# Patient Record
Sex: MALE | Race: OTHER | NOT HISPANIC OR LATINO | Employment: STUDENT | URBAN - METROPOLITAN AREA
[De-identification: names, ages, dates, MRNs, and addresses within clinical notes are randomized per-mention and may not be internally consistent; named-entity substitution may affect disease eponyms.]

---

## 2023-10-12 ENCOUNTER — OFFICE VISIT (OUTPATIENT)
Dept: PRIMARY CARE | Facility: HOSPITAL | Age: 38
End: 2023-10-12
Payer: COMMERCIAL

## 2023-10-12 VITALS
HEIGHT: 70 IN | DIASTOLIC BLOOD PRESSURE: 69 MMHG | WEIGHT: 172 LBS | BODY MASS INDEX: 24.62 KG/M2 | SYSTOLIC BLOOD PRESSURE: 111 MMHG | HEART RATE: 69 BPM | OXYGEN SATURATION: 98 % | TEMPERATURE: 98.9 F

## 2023-10-12 DIAGNOSIS — E03.9 HYPOTHYROIDISM, UNSPECIFIED TYPE: Primary | ICD-10-CM

## 2023-10-12 LAB
ALBUMIN SERPL BCP-MCNC: 4.5 G/DL (ref 3.4–5)
ALP SERPL-CCNC: 62 U/L (ref 33–120)
ALT SERPL W P-5'-P-CCNC: 20 U/L (ref 10–52)
ANION GAP SERPL CALC-SCNC: 15 MMOL/L (ref 10–20)
AST SERPL W P-5'-P-CCNC: 17 U/L (ref 9–39)
BILIRUB SERPL-MCNC: 0.4 MG/DL (ref 0–1.2)
BUN SERPL-MCNC: 20 MG/DL (ref 6–23)
CALCIUM SERPL-MCNC: 9.4 MG/DL (ref 8.6–10.6)
CHLORIDE SERPL-SCNC: 102 MMOL/L (ref 98–107)
CO2 SERPL-SCNC: 27 MMOL/L (ref 21–32)
CREAT SERPL-MCNC: 1.23 MG/DL (ref 0.5–1.3)
GFR SERPL CREATININE-BSD FRML MDRD: 77 ML/MIN/1.73M*2
GLUCOSE SERPL-MCNC: 102 MG/DL (ref 74–99)
POTASSIUM SERPL-SCNC: 4.5 MMOL/L (ref 3.5–5.3)
PROT SERPL-MCNC: 7.7 G/DL (ref 6.4–8.2)
SODIUM SERPL-SCNC: 139 MMOL/L (ref 136–145)
TSH SERPL-ACNC: 5.28 MIU/L (ref 0.44–3.98)

## 2023-10-12 PROCEDURE — 84443 ASSAY THYROID STIM HORMONE: CPT | Performed by: STUDENT IN AN ORGANIZED HEALTH CARE EDUCATION/TRAINING PROGRAM

## 2023-10-12 PROCEDURE — 99214 OFFICE O/P EST MOD 30 MIN: CPT | Mod: GC | Performed by: STUDENT IN AN ORGANIZED HEALTH CARE EDUCATION/TRAINING PROGRAM

## 2023-10-12 PROCEDURE — 36415 COLL VENOUS BLD VENIPUNCTURE: CPT | Performed by: STUDENT IN AN ORGANIZED HEALTH CARE EDUCATION/TRAINING PROGRAM

## 2023-10-12 PROCEDURE — 80053 COMPREHEN METABOLIC PANEL: CPT | Performed by: STUDENT IN AN ORGANIZED HEALTH CARE EDUCATION/TRAINING PROGRAM

## 2023-10-12 PROCEDURE — 99214 OFFICE O/P EST MOD 30 MIN: CPT | Performed by: STUDENT IN AN ORGANIZED HEALTH CARE EDUCATION/TRAINING PROGRAM

## 2023-10-12 PROCEDURE — 1036F TOBACCO NON-USER: CPT | Performed by: STUDENT IN AN ORGANIZED HEALTH CARE EDUCATION/TRAINING PROGRAM

## 2023-10-12 RX ORDER — LEVOTHYROXINE SODIUM 50 UG/1
50 TABLET ORAL
Qty: 30 TABLET | Refills: 11 | Status: SHIPPED | OUTPATIENT
Start: 2023-10-12 | End: 2023-10-16 | Stop reason: SDUPTHER

## 2023-10-12 SDOH — ECONOMIC STABILITY: FOOD INSECURITY: WITHIN THE PAST 12 MONTHS, THE FOOD YOU BOUGHT JUST DIDN'T LAST AND YOU DIDN'T HAVE MONEY TO GET MORE.: NEVER TRUE

## 2023-10-12 SDOH — ECONOMIC STABILITY: FOOD INSECURITY: WITHIN THE PAST 12 MONTHS, YOU WORRIED THAT YOUR FOOD WOULD RUN OUT BEFORE YOU GOT MONEY TO BUY MORE.: NEVER TRUE

## 2023-10-12 ASSESSMENT — PAIN SCALES - GENERAL: PAINLEVEL: 0-NO PAIN

## 2023-10-12 ASSESSMENT — LIFESTYLE VARIABLES
HOW OFTEN DO YOU HAVE SIX OR MORE DRINKS ON ONE OCCASION: NEVER
SKIP TO QUESTIONS 9-10: 1
AUDIT-C TOTAL SCORE: 0
HOW MANY STANDARD DRINKS CONTAINING ALCOHOL DO YOU HAVE ON A TYPICAL DAY: PATIENT DOES NOT DRINK
HOW OFTEN DO YOU HAVE A DRINK CONTAINING ALCOHOL: NEVER

## 2023-10-12 ASSESSMENT — ENCOUNTER SYMPTOMS
DEPRESSION: 0
LOSS OF SENSATION IN FEET: 0
OCCASIONAL FEELINGS OF UNSTEADINESS: 0

## 2023-10-12 ASSESSMENT — PATIENT HEALTH QUESTIONNAIRE - PHQ9
2. FEELING DOWN, DEPRESSED OR HOPELESS: NOT AT ALL
1. LITTLE INTEREST OR PLEASURE IN DOING THINGS: NOT AT ALL
SUM OF ALL RESPONSES TO PHQ9 QUESTIONS 1 & 2: 0

## 2023-10-12 NOTE — PROGRESS NOTES
Reason for Visit: Establish Care; Script for Synthroid   HPI:    37 M with PMHx of Hypothyroidism (2016 - unclear etiology) who presents to establish care. Reports moved to [facility] (for [facility]/ graduate nursing school) from [place] in Dec and recently ran out of his synthroid medication. Diagnosed with hypothyroidism in 2016 after having symptoms of weakness and lethargy. ROS negative at this time. Not interested in the Flu shot today.     VSS  Meds:  Synthroid 50 MCG      Allergies: none  Past Medical History: as above  Past Surgical History: none  Social History: Single; graduate school student in nursing. Moved to [facility] from [place]. Does not smoke or drink.     ROS: Comprehensive ROS performed and negative except as per HPI      Constitutional: NAD  Eyes: EOMI, clear sclera  HENT: MMM  Head/neck: no appreciable JVD  Resp/thorax: CTAB, adequate air movement, no inc WOB  CV: +S1/S2, RRR, no m/r/g  GI: soft, NT,ND, +BS, no appreciable HSM  Ext: no edema/asymmetry  Neuro: AOx4, pleasant, conversational, following commands, no gross focal neuro deficits  Skin: warm and dry  Psych: mood/affect appropriate      Assessment/Plan:    37 M with PMHx of Hypothyroidism (2016 - unclear etiology) who presents to establish care. Overall well and without acute complaints. Only requires medication refill.     #Hypothyroidism   -Refill Synthroid 50 MCG  -Obtain TSH and CMP     #HM  - Colonoscopy: start at 44   Cardiovascular Screening   - ASCVD risk score: consider at next visit   - HIV: consider at next visit   - Syphilis consider at next visit   - Hepatitis C: consider at next visit   Vaccines - Discuss vaccination hx at next visit   - Influenza: Declined   - Tdap:   - Pneumonia:   - COVID:      RTC 1 year

## 2023-10-12 NOTE — PATIENT INSTRUCTIONS
Hello Bootan. It was nice to meet you. We sent your thyroid medication to the [facility] on [address]. Please come back to the clinic on next year.

## 2023-10-13 NOTE — PROGRESS NOTES
I saw and evaluated the patient. I personally obtained the key and critical portions of the history and physical exam or was physically present for key and critical portions performed by the resident/fellow. I reviewed the resident/fellow's documentation and discussed the patient with the resident/fellow. I agree with the resident/fellow's medical decision making as documented in the note.    Ofe Marroquin MD MPH

## 2023-10-16 DIAGNOSIS — E03.9 HYPOTHYROIDISM, UNSPECIFIED TYPE: Primary | ICD-10-CM

## 2023-10-16 RX ORDER — LEVOTHYROXINE SODIUM 50 UG/1
75 TABLET ORAL
Qty: 30 TABLET | Refills: 11 | Status: SHIPPED | OUTPATIENT
Start: 2023-10-16 | End: 2023-10-16 | Stop reason: ENTERED-IN-ERROR

## 2023-10-16 RX ORDER — LEVOTHYROXINE SODIUM 50 UG/1
50 TABLET ORAL
Qty: 30 TABLET | Refills: 11 | Status: SHIPPED | OUTPATIENT
Start: 2023-10-16 | End: 2024-10-15

## 2023-10-16 NOTE — PROGRESS NOTES
TSH drawn week prior (with reflex to T4) demonstrated hypothyroid state. Spoke to patient on the phone and he informed that he had run out and was not taking his synthroid medications for 3 weeks. In light of this, we agreed to continue on his prior dosing of 50 mcg/day and to recheck TSH with reflex in 4 weeks time.

## 2024-03-11 ENCOUNTER — APPOINTMENT (OUTPATIENT)
Dept: PRIMARY CARE | Facility: HOSPITAL | Age: 39
End: 2024-03-11
Payer: COMMERCIAL

## 2024-03-11 NOTE — PROGRESS NOTES
Reason for Visit: Establish Care; Script for Synthroid   HPI:    Lena Batista is a 36 y/o Male  with PMHx of Hypothyroidism (2016 - unclear etiology) who presents for follow up.     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 Diagnosed with hypothyroidism in 2016 after having symptoms of weakness and lethargy. Since then patient reported       Meds:  Synthroid 50 MCG      Allergies: none  Past Medical History: as above  Past Surgical History: none  Social History: Single; graduate school student in nursing. Moved to [facility] from [place]. Does not smoke or drink.     ROS: Comprehensive ROS performed and negative except as per HPI      Constitutional: NAD  Eyes: EOMI, clear sclera  HENT: MMM  Head/neck: no appreciable JVD  Resp/thorax: CTAB, adequate air movement, no inc WOB  CV: +S1/S2, RRR, no m/r/g  GI: soft, NT,ND, +BS, no appreciable HSM  Ext: no edema/asymmetry  Neuro: AOx4, pleasant, conversational, following commands, no gross focal neuro deficits  Skin: warm and dry  Psych: mood/affect appropriate      Assessment/Plan:    37 M with PMHx of Hypothyroidism (2016 - unclear etiology) who presents to clinic for follow up.     #Hypothyroidism   -Refill Synthroid 50 MCG  -Obtain TSH and CMP     #HM  - Colonoscopy: start at 44     Cardiovascular Screening   - ASCVD risk score: consider at next visit   - HIV: consider at next visit   - Syphilis consider at next visit   - Hepatitis C: consider at next visit     Vaccines - Discuss vaccination hx at next visit   - Influenza: Declined   - Tdap:   - Pneumonia:   - COVID:      RTC 1 year

## 2024-06-24 ENCOUNTER — APPOINTMENT (OUTPATIENT)
Dept: SURGERY | Facility: CLINIC | Age: 39
End: 2024-06-24
Payer: COMMERCIAL

## 2024-10-17 DIAGNOSIS — E03.9 HYPOTHYROIDISM, UNSPECIFIED TYPE: ICD-10-CM

## 2024-10-17 RX ORDER — LEVOTHYROXINE SODIUM 50 UG/1
50 TABLET ORAL
Qty: 30 TABLET | Refills: 11 | Status: SHIPPED | OUTPATIENT
Start: 2024-10-17 | End: 2024-10-17 | Stop reason: WASHOUT

## 2024-11-04 ENCOUNTER — OFFICE VISIT (OUTPATIENT)
Dept: PRIMARY CARE | Facility: HOSPITAL | Age: 39
End: 2024-11-04
Payer: COMMERCIAL

## 2024-11-04 ENCOUNTER — DOCUMENTATION (OUTPATIENT)
Dept: PRIMARY CARE | Facility: HOSPITAL | Age: 39
End: 2024-11-04

## 2024-11-04 VITALS
HEART RATE: 66 BPM | WEIGHT: 168 LBS | BODY MASS INDEX: 24.05 KG/M2 | DIASTOLIC BLOOD PRESSURE: 69 MMHG | OXYGEN SATURATION: 98 % | HEIGHT: 70 IN | TEMPERATURE: 97.4 F | SYSTOLIC BLOOD PRESSURE: 104 MMHG

## 2024-11-04 DIAGNOSIS — H00.012 HORDEOLUM EXTERNUM OF RIGHT LOWER EYELID: ICD-10-CM

## 2024-11-04 DIAGNOSIS — Z00.00 HEALTH CARE MAINTENANCE: ICD-10-CM

## 2024-11-04 DIAGNOSIS — E03.9 HYPOTHYROIDISM, UNSPECIFIED TYPE: Primary | ICD-10-CM

## 2024-11-04 PROCEDURE — 99213 OFFICE O/P EST LOW 20 MIN: CPT

## 2024-11-04 PROCEDURE — 99213 OFFICE O/P EST LOW 20 MIN: CPT | Mod: GC

## 2024-11-04 PROCEDURE — 1036F TOBACCO NON-USER: CPT

## 2024-11-04 PROCEDURE — 3008F BODY MASS INDEX DOCD: CPT

## 2024-11-04 RX ORDER — LEVOTHYROXINE SODIUM 50 UG/1
50 TABLET ORAL DAILY
Qty: 90 TABLET | Refills: 3 | Status: SHIPPED | OUTPATIENT
Start: 2024-11-04 | End: 2025-11-04

## 2024-11-04 ASSESSMENT — PATIENT HEALTH QUESTIONNAIRE - PHQ9
2. FEELING DOWN, DEPRESSED OR HOPELESS: NOT AT ALL
SUM OF ALL RESPONSES TO PHQ9 QUESTIONS 1 AND 2: 0
1. LITTLE INTEREST OR PLEASURE IN DOING THINGS: NOT AT ALL

## 2024-11-04 ASSESSMENT — PAIN SCALES - GENERAL: PAINLEVEL_OUTOF10: 0-NO PAIN

## 2024-11-04 ASSESSMENT — COLUMBIA-SUICIDE SEVERITY RATING SCALE - C-SSRS
2. HAVE YOU ACTUALLY HAD ANY THOUGHTS OF KILLING YOURSELF?: NO
1. IN THE PAST MONTH, HAVE YOU WISHED YOU WERE DEAD OR WISHED YOU COULD GO TO SLEEP AND NOT WAKE UP?: NO
6. HAVE YOU EVER DONE ANYTHING, STARTED TO DO ANYTHING, OR PREPARED TO DO ANYTHING TO END YOUR LIFE?: NO

## 2024-11-04 ASSESSMENT — ENCOUNTER SYMPTOMS
DEPRESSION: 0
OCCASIONAL FEELINGS OF UNSTEADINESS: 0
LOSS OF SENSATION IN FEET: 0

## 2024-11-04 NOTE — PATIENT INSTRUCTIONS
It was very nice to see you in the clinic today. These are the things we talked about today.  I sent the refill for your levothyroxine to your pharmacy.  I also ordered labs for you, please get these done in the lab at your earliest convenience. This includes a fasting lipid panel to check your cholesterol so please do not have anything to eat or drink except water on the morning you decide to get the labs done.  For your eye, this is a condition called a hordeolum (stye). We talked about warm compresses and gently cleansing the eye lid with tear free shampoo which should help. Please try not to pinch it as this can cause infection. If after a few weeks it still isn't getting better please let us know.  If you are ever interested in starting PrEP please let us know, we'd be happy to talk to you more about this if you're ever interested or considering becoming sexually active.  Please return to the clinic in 12 months for a follow up visit, or earlier if needed.  Please also get your flu shot, and remember to submit your vaccination records so we can have them on file.    If you have any questions, would like to be seen by a doctor, or need refills on your medications please call our office at [phone].

## 2024-11-04 NOTE — PROGRESS NOTES
Advanced care planning discussed at this visit.  Patient does not currently have a Healthcare Power of  or Living Will in place. He does express that his friend Dario Lyon has his permission to act as his surrogate decision maker in the event of an emergency. Patient advised to bring in POA, Living Will and Advanced Care Plan for his chart if he  obtains one.  Patient declined information and documentation on POA and Living Will at this time.

## 2024-11-04 NOTE — PROGRESS NOTES
[facility]    CC:  HPI:  Lena Batista is a 39 y.o. male with PMH of hypothyrodism who presented to [facility] for follow up visit.    Interval History: He was last seen at [facility] on 10/12/2023 to establish care. 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 He was given script with plan to follow up in 1 year.    Per the patient: Working on nursing PhD, intense program but going well. Has an upcoming exam that he's stressed about, studying social determinants of health. Has 2 years left in the program. States he mostly just needs a refill for his levothyroxine. Also has a bump on his lower right eye lid that has been there for a week, he states he squeezed it earlier today and expressed some material from it. It isn't painful and his vision isn't affected, but it is bothersome to him. He's gotten some artificial tears which he hopes will help, he states he's had a similar problem in the past in his other eye that he ended up needing surgery to fix.     Health maintenance:  Health Maintenance   Topic Date Due    Yearly Adult Physical  Never done    Lipid Panel  Never done    MMR Vaccines (1 of 1 - Standard series) Never done    Varicella Vaccines (1 of 2 - 13+ 2-dose series) Never done    Hepatitis C Screening  Never done    Hepatitis B Vaccines (1 of 3 - 19+ 3-dose series) Never done    DTaP/Tdap/Td Vaccines (1 - Tdap) Never done    Influenza Vaccine (1) Never done    COVID-19 Vaccine (3 - 2024-25 season) 09/01/2024    TSH Level  10/12/2024    Zoster Vaccines (1 of 2) 10/13/2034    HIV Screening  Completed    HIB Vaccines  Aged Out    IPV Vaccines  Aged Out    Hepatitis A Vaccines  Aged Out    Meningococcal Vaccine  Aged Out    Rotavirus Vaccines  Aged Out    HPV Vaccines  Aged Out    Pneumococcal Vaccine: Pediatrics (0 to 5 Years) and At-Risk Patients (6 to 64 Years)  Aged Out        Medications:    Current Outpatient Medications:     levothyroxine (Synthroid, Levoxyl) 50 mcg tablet, Take 1 tablet (50 mcg) by mouth early in the morning.. Take on an empty stomach at the same time each day, either 30 to 60 minutes prior to breakfast, Disp: 90 tablet, Rfl: 3    Require med refills? levothyroxine  Preferred pharmacy: [address]       Past medical history:  No past medical history on file.    Allergies:  No Known Allergies    Surgical history:  No past surgical history on file.    Family history:  No family history on file.    Social history:   reports that he has never smoked. He has never used smokeless tobacco. He reports that he does not drink alcohol and does not use drugs.    Social History     Social History Narrative    Not on file     Social history  Work: Nursing PhD student  Alcohol: Denies drinking   Tobacco: Never smoker  Other drugs: Denies other drugs  Sexually active?: States he is not currently sexually active but has considered PReP in the past. Not currently interested in STI testing.    Review of systems:  Constitutional: negative for fevers, chills, weight loss, weight gain, change in appetite, fatigue, weakness.  HEENT: negative for headache, changes in vision or hearing, congestion, sore throat.  Respiratory: negative for SOB, cough, hemoptysis, wheezing  Cardiovascular: negative for chest pain, palpitations, orthopnea, PND  GI: negative for dysphagia, abdominal pain, nausea, vomiting, diarrhea, constipation, melena, hematochezia, BRBPR  : negative for frequency, urgency, dysuria, hematuria, incontinence  MSK: negative for myalgia, arthralgia, decreased joint ROM, LE swelling  Skin: negative for rash, wounds  Heme/lymph: negative for easy bruising, bleeding, epistaxis  Neuro: negative for LOC, numbness, tingling, tremor, vertigo, dizziness    Vitals:  Vitals:    11/04/24 1505   BP: 104/69   Pulse: 66   Temp: 36.3 °C (97.4 °F)   SpO2: 98%       Physical  exam:  GENERAL.: Vitals noted, no distress.   HEENT: Normocephalic, atraumatic, MMM. No lymphadenopathy.  EYES: PERRLA, EOMI. Normal conjunctiva. No scleral icterus, swelling present over right lower eyelid, non tender, no purulence noted  NECK: Supple, FROM, no thyroid nodules noted  CV: Regular rate rhythm. No murmurs appreciated. Intact radial pulses.  LUNGS: Clear to auscultation bilaterally. Symmetric chest rise. No wheezes, rales, or rhonchi  ABDOMEN: Soft, nontender. No distention.  EXTREMITIES: No edema. FROM  SKIN: No rash noted on exposed skin  NEURO: No focal neurologic deficits. Alert and interactive, spontaneously moves all 4 extremities  PSYCH: Appropriate mood and affect    Assessment and Plan:  Lena Batista is a 39 y.o. male with PMH of hypothyroidism who presented to [facility] for follow up visit. Primary concerns include med refills and swelling on right lower eyelid c/w hordeolum.     #Hypothyroidism  -diagnosed 2016 per the patient, unclear etiology  -[facility] lab results reviewed in Care Everywhere, TSH was WNL as of 5/2022  -last TSH 5.28 (10/2023) in setting of running out of meds  -currently on levothyroxine 50 mcg daily    Plan:  -repeat TSH  -continue levothyroxine 50 mcg daily    #Hordeolum  -noted on exam, patient reports has been present about 1 week and is bothersome but not currently painful    Plan:  -recommended conservative management with warm compresses, gentle cleansing with tear free shampoo, and avoidance of manipulation/expressing material due to potential risk of infection  -if remains present for several weeks despite this management, would refer to ophthalmology for further management    #Interest in PReP  -patient states he is not currently sexually active, but has considered PReP in the past  -He generally tries to limit medications that he is taking, and is not interested in starting PReP now, but we discussed that our office would be happy to  discuss this with him in the future if things change.     #Health Maintenance  #Immunizations: Recommended flu shot, encouraged patient to bring records for review to next appointment    #Screening:  -Cardiovascular: lipid panel (12/2020) with cholesterol 122, HDL 50, LDL 63, trig 43  -Diabetes: no HBA1C on file  -Cancer: Colorectal (Due at 44), Lung (not indicated)  -Infectious Disease: HIV negative 5/2022, no HEPC on file  -Osteoporosis: not indicated  Behavioral Counseling: Tobacco/smoking (never smoker), alcohol (does not drink), safety (no current concerns), diet/exercise (states he exercises regularly)    Plan:  -lipid panel, HBA1C, HEPC ordered  -recommended flu shot  -encouraged patient to bring immunization records to next visit or submit via [employer]    RTC 12 months or earlier if needed    Pt staffed with attending Dr. Marin

## 2024-11-05 NOTE — PROGRESS NOTES
I saw and evaluated the patient. I personally obtained the key and critical portions of the history and physical exam or was physically present for key and critical portions performed by the resident/fellow. I reviewed the resident/fellow's documentation and discussed the patient with the resident/fellow. I agree with the resident/fellow's medical decision making as documented in the note.    Padmini Marin MD MPH

## 2025-02-07 ENCOUNTER — OFFICE VISIT (OUTPATIENT)
Dept: URGENT CARE | Age: 40
End: 2025-02-07
Payer: COMMERCIAL

## 2025-02-07 VITALS
SYSTOLIC BLOOD PRESSURE: 111 MMHG | BODY MASS INDEX: 24.11 KG/M2 | WEIGHT: 168 LBS | HEART RATE: 96 BPM | TEMPERATURE: 98.3 F | DIASTOLIC BLOOD PRESSURE: 75 MMHG | RESPIRATION RATE: 16 BRPM | OXYGEN SATURATION: 96 %

## 2025-02-07 DIAGNOSIS — J10.1 INFLUENZA B: ICD-10-CM

## 2025-02-07 DIAGNOSIS — R68.89 FLU-LIKE SYMPTOMS: Primary | ICD-10-CM

## 2025-02-07 LAB
POC RAPID INFLUENZA A: NEGATIVE
POC RAPID INFLUENZA B: POSITIVE
POC SARS-COV-2 AG BINAX: NORMAL

## 2025-02-07 RX ORDER — BENZONATATE 200 MG/1
200 CAPSULE ORAL 3 TIMES DAILY PRN
Qty: 30 CAPSULE | Refills: 0 | Status: SHIPPED | OUTPATIENT
Start: 2025-02-07 | End: 2025-02-14

## 2025-02-07 RX ORDER — OSELTAMIVIR PHOSPHATE 75 MG/1
75 CAPSULE ORAL EVERY 12 HOURS
Qty: 10 CAPSULE | Refills: 0 | Status: SHIPPED | OUTPATIENT
Start: 2025-02-07 | End: 2025-02-12

## 2025-02-07 ASSESSMENT — ENCOUNTER SYMPTOMS
COUGH: 1
LOSS OF CONSCIOUSNESS: 1
HEADACHES: 1

## 2025-02-07 NOTE — PROGRESS NOTES
Subjective   Patient ID: Lena Batista is a 39 y.o. male. They present today with a chief complaint of Cough, Generalized Body Aches, Headache (Onset sx Tuesday along with bloody sputum), Loss of Consciousness, and Poor Appetite.    History of Present Illness    Cough  Associated symptoms include headaches.   Headache  Associated symptoms: congestion and cough    Loss of Consciousness  Associated symptoms: congestion, cough, headaches and loss of consciousness        Past Medical History  Allergies as of 02/07/2025    (No Known Allergies)       (Not in a hospital admission)       History reviewed. No pertinent past medical history.    History reviewed. No pertinent surgical history.     reports that he has never smoked. He has never used smokeless tobacco. He reports that he does not drink alcohol and does not use drugs.    Review of Systems  Review of Systems   HENT:  Positive for congestion.    Respiratory:  Positive for cough.    Neurological:  Positive for loss of consciousness and headaches.   All other systems reviewed and are negative.                                 Objective    Vitals:    02/07/25 0951   BP: 111/75   Pulse: 96   Resp: 16   Temp: 36.8 °C (98.3 °F)   TempSrc: Oral   SpO2: 96%   Weight: 76.2 kg (168 lb)     No LMP for male patient.    Physical Exam  Vitals and nursing note reviewed.   Constitutional:       Appearance: Normal appearance. He is ill-appearing.   HENT:      Head: Normocephalic.      Nose: Congestion and rhinorrhea present.      Mouth/Throat:      Mouth: Mucous membranes are moist.      Pharynx: Oropharynx is clear.   Eyes:      Extraocular Movements: Extraocular movements intact.      Pupils: Pupils are equal, round, and reactive to light.   Cardiovascular:      Rate and Rhythm: Normal rate and regular rhythm.      Pulses: Normal pulses.      Heart sounds: Normal heart sounds.   Pulmonary:      Effort: Pulmonary effort is normal.      Breath sounds: Normal breath sounds.    Musculoskeletal:         General: Normal range of motion.      Cervical back: Normal range of motion and neck supple.   Skin:     General: Skin is warm and dry.   Neurological:      General: No focal deficit present.      Mental Status: He is alert and oriented to person, place, and time.   Psychiatric:         Mood and Affect: Mood normal.         Behavior: Behavior normal.         Procedures    Point of Care Test & Imaging Results from this visit  No results found for this visit on 02/07/25.   No results found.    Diagnostic study results (if any) were reviewed by [facility].    Assessment/Plan   Allergies, medications, history, and pertinent labs/EKGs/Imaging reviewed by Jane Otoloe, APRN-CNP.     Medical Decision Making  Rapid covid- NEG  Rapid flu B- POSITIVE  Pt stated he fainted getting up from bed two days ago, has not been drinking much  Education provided to pt multiple times, if pt lost consciousness he is required to go to ER for further evaluation and provided reasoning. Pt stated he did not black out, but faint sensation  Again, educated pt on need for further evaluation, but pt states he felt better past two days    Pt managing secretions, airway intact. There or no signs of PTA/TA, trismus, retropharyngeal abscess or epiglotitis. No signs of osteomyelitis, orbital cellulitis or other complications of sinusitis at this point in time. CXR deferred at this time as lungs are CTA and there is no signs of respiratory distress. SpO2 >94% on RA. There is no reported SOB, CP, NICE, pleuritic pain, fever. Clinical suspicions of pneumonia or other cardiorespiratory catastrophe at this time are low. Pt is ambulating without difficulty showing no signs of hypoxia. Given the pt underlying risk factors, and exam will err on the side of caution and prescribe tamiflu and tessalon perles. Pt well-hydrated, nontoxic and there no signs of clinical deterioration. Patient well hydrated,  neurovascularly intact. Advised normal saline mist spray TID, flonase daily, antihistamine daily, vit d3/c/zinc/elderberry, probiotics for gut health, and if s/s persist after 48 hours to f/u PCP  Stay home for 24-48 hours for rest and reduce contagion  If s/s worsen, go to ER    Follow up Care: Pt instructed to follow-up with PCP or other appropriate clinician within 24 to 48 hours. Report to ED if there is any development in worsening pain, difficulty swallowing, change in phonation, fever, chills, neck pain, photophobia, headache, neck stiffness, chest pain, abdominal pain, vomiting, syncope, hemoptysis, leg swelling SOB, fever, facial swelling, eye pain, periorbital swelling/erythema, or any new signs or sx.     The patient was educated regarding diagnosis, supportive care, OTC and Rx medications. The patient was given the opportunity to ask questions prior to discharge. They verbalized understanding of my discussion of the plans for treatment, expected course, indications to return to UC or seek further evaluation in ED, and the need for timely follow up as directed.     Can try Sambucol Black Elderberry Syrup and Extra Vitamin C and Zinc Lozenges (lozenges only if over 3 years of age)      1. **Stay Home**: Individuals who are sick should stay home for at least 24 hours after their fever has subsided without the use of fever-reducing medications.  2. **Avoid Close Contact**: Sick individuals should avoid close contact with others to prevent spreading the virus.  3. **Hygiene Practices**: Frequent handwashing, using hand sanitizers, and covering coughs and sneezes are encouraged to reduce transmission.  4. **Vaccination**: Annual flu vaccines are recommended for most individuals to help prevent illness      Orders and Diagnoses  Diagnoses and all orders for this visit:  Flu-like symptoms  -     POCT Influenza A/B manually resulted  -     POCT Covid-19 Rapid Antigen  Influenza B  -     oseltamivir (Tamiflu) 75  mg capsule; Take 1 capsule (75 mg) by mouth every 12 hours for 5 days.  -     benzonatate (Tessalon) 200 mg capsule; Take 1 capsule (200 mg) by mouth 3 times a day as needed for cough for up to 7 days. Do not crush or chew.      Medical Admin Record      Patient disposition: Home    Electronically signed by [facility]  10:04 AM       I will STOP taking the medications listed below when I get home from the hospital:  None

## 2025-05-21 ENCOUNTER — APPOINTMENT (OUTPATIENT)
Dept: OPHTHALMOLOGY | Facility: CLINIC | Age: 40
End: 2025-05-21
Payer: COMMERCIAL

## 2025-05-21 DIAGNOSIS — H00.12 CHALAZION OF RIGHT LOWER EYELID: Primary | ICD-10-CM

## 2025-05-21 PROCEDURE — 99203 OFFICE O/P NEW LOW 30 MIN: CPT | Performed by: OPTOMETRIST

## 2025-05-21 RX ORDER — DOXYCYCLINE 50 MG/1
50 CAPSULE ORAL 2 TIMES DAILY
Qty: 28 CAPSULE | Refills: 0 | Status: SHIPPED | OUTPATIENT
Start: 2025-05-21 | End: 2025-06-04

## 2025-05-21 ASSESSMENT — ENCOUNTER SYMPTOMS
CONSTITUTIONAL NEGATIVE: 0
HEMATOLOGIC/LYMPHATIC NEGATIVE: 0
MUSCULOSKELETAL NEGATIVE: 0
NEUROLOGICAL NEGATIVE: 0
GASTROINTESTINAL NEGATIVE: 0
ALLERGIC/IMMUNOLOGIC NEGATIVE: 0
EYES NEGATIVE: 1
PSYCHIATRIC NEGATIVE: 0
ENDOCRINE NEGATIVE: 0
CARDIOVASCULAR NEGATIVE: 0
RESPIRATORY NEGATIVE: 0

## 2025-05-21 ASSESSMENT — TONOMETRY
OS_IOP_MMHG: 16
OD_IOP_MMHG: 15
IOP_METHOD: TONOPEN

## 2025-05-21 ASSESSMENT — REFRACTION_MANIFEST
OD_SPHERE: -1.00
OD_SPHERE: -0.75
OS_CYLINDER: -0.50
OS_ADD: +0.25
OS_CYLINDER: -0.50
OD_ADD: +0.25
OD_AXIS: 160
OS_AXIS: 180
OD_CYLINDER: -1.50
OS_SPHERE: -2.50
OD_CYLINDER: -1.50
OS_SPHERE: -2.00
OS_AXIS: 180
OD_AXIS: 155

## 2025-05-21 ASSESSMENT — VISUAL ACUITY
METHOD: SNELLEN - LINEAR
OS_CC+: -3
OS_CC: 20/20
OD_CC+: -3
OD_CC: 20/20

## 2025-05-21 ASSESSMENT — CONF VISUAL FIELD
OD_NORMAL: 1
OS_INFERIOR_NASAL_RESTRICTION: 0
OS_INFERIOR_TEMPORAL_RESTRICTION: 0
OS_NORMAL: 1
OS_SUPERIOR_TEMPORAL_RESTRICTION: 0
OD_INFERIOR_TEMPORAL_RESTRICTION: 0
OS_SUPERIOR_NASAL_RESTRICTION: 0
OD_SUPERIOR_NASAL_RESTRICTION: 0
OD_INFERIOR_NASAL_RESTRICTION: 0
OD_SUPERIOR_TEMPORAL_RESTRICTION: 0

## 2025-05-21 ASSESSMENT — CUP TO DISC RATIO
OD_RATIO: 0.25
OS_RATIO: 0.25

## 2025-05-21 ASSESSMENT — EXTERNAL EXAM - RIGHT EYE: OD_EXAM: NORMAL

## 2025-05-21 ASSESSMENT — EXTERNAL EXAM - LEFT EYE: OS_EXAM: NORMAL

## 2025-05-21 ASSESSMENT — SLIT LAMP EXAM - LIDS: COMMENTS: 1-2+ LASH DEBRIS

## 2025-05-21 NOTE — ASSESSMENT & PLAN NOTE
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 Onset 1 year ago. Reports recurrent lesion on left upper eyelid which was treated with minor procedure.   Pt educated on findings. Start doxycycline 50mg BID x 14 days. Recommend warm compresses with laury mask twice per day for 10 minutes. Discussed maintaining good eyelid hygiene with daily eyelid scrubs. Will schedule f/u with Dr. Soto in 1 month. Pt voiced understanding.

## 2025-05-21 NOTE — PROGRESS NOTES
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 Onset 1 year ago. Reports recurrent lesion on left upper eyelid which was treated with minor procedure.   Pt educated on findings. Start doxycycline 50mg BID x 14 days. Recommend warm compresses with laury mask twice per day for 10 minutes. Discussed maintaining good eyelid hygiene with daily eyelid scrubs. Will schedule f/u with Dr. Soto in 1 month. Pt voiced understanding.         Relevant Medications    doxycycline (Monodox) 50 mg capsule

## 2025-06-26 ENCOUNTER — APPOINTMENT (OUTPATIENT)
Dept: OPHTHALMOLOGY | Facility: CLINIC | Age: 40
End: 2025-06-26
Payer: COMMERCIAL

## 2025-07-30 ENCOUNTER — APPOINTMENT (OUTPATIENT)
Dept: OPHTHALMOLOGY | Facility: CLINIC | Age: 40
End: 2025-07-30
Payer: COMMERCIAL

## 2025-08-26 DIAGNOSIS — E03.9 HYPOTHYROIDISM, UNSPECIFIED TYPE: ICD-10-CM

## 2025-08-26 RX ORDER — LEVOTHYROXINE SODIUM 50 UG/1
50 TABLET ORAL DAILY
Qty: 90 TABLET | Refills: 0 | Status: SHIPPED | OUTPATIENT
Start: 2025-08-26 | End: 2025-08-27 | Stop reason: SDUPTHER

## 2025-08-27 DIAGNOSIS — E03.9 HYPOTHYROIDISM, UNSPECIFIED TYPE: ICD-10-CM

## 2025-08-27 RX ORDER — LEVOTHYROXINE SODIUM 50 UG/1
50 TABLET ORAL DAILY
Qty: 90 TABLET | Refills: 3 | Status: SHIPPED | OUTPATIENT
Start: 2025-08-27 | End: 2026-08-27

## 2025-09-22 ENCOUNTER — APPOINTMENT (OUTPATIENT)
Dept: OPHTHALMOLOGY | Facility: CLINIC | Age: 40
End: 2025-09-22
Payer: COMMERCIAL